# Patient Record
Sex: FEMALE | URBAN - METROPOLITAN AREA
[De-identification: names, ages, dates, MRNs, and addresses within clinical notes are randomized per-mention and may not be internally consistent; named-entity substitution may affect disease eponyms.]

---

## 2022-01-18 ENCOUNTER — SURGERY (OUTPATIENT)
Dept: URBAN - METROPOLITAN AREA SURGERY 56 | Facility: SURGERY | Age: 52
End: 2022-01-18
Payer: COMMERCIAL

## 2022-01-18 ENCOUNTER — OFFICE VISIT (OUTPATIENT)
Dept: URBAN - METROPOLITAN AREA CLINIC 89 | Facility: CLINIC | Age: 52
End: 2022-01-18
Payer: MEDICAID

## 2022-01-18 DIAGNOSIS — H40.20X3: ICD-10-CM

## 2022-01-18 DIAGNOSIS — H40.033 ANATOMICAL NARROW ANGLE, BILATERAL: Primary | ICD-10-CM

## 2022-01-18 PROCEDURE — 99204 OFFICE O/P NEW MOD 45 MIN: CPT | Performed by: OPTOMETRIST

## 2022-01-18 ASSESSMENT — INTRAOCULAR PRESSURE
OD: 12
OS: 60
OS: 60
OD: 12

## 2022-01-18 NOTE — IMPRESSION/PLAN
Impression: Primary angle-closure glaucoma, severe stage: H40.20x3. Plan: Advised patient that this is long standing and that there is little we can do to restore vision. Goal at this time is to reduce IOP and comfort the eye and prevent angle closure in the right eye.

## 2022-01-18 NOTE — IMPRESSION/PLAN
Impression: Anatomical narrow angle, bilateral: H40.033. Plan: Patient requires STAT YAG LPI, however cannot travel to [facility] (I had arranged for treatment by Claudeen Raman MD but patient stated she could not travel). Added patient to Dr José Antonio Santos schedule for today: BILATERAL YAG PI, SAME DAY

## 2022-01-19 ENCOUNTER — POST-OPERATIVE VISIT (OUTPATIENT)
Dept: URBAN - METROPOLITAN AREA CLINIC 89 | Facility: CLINIC | Age: 52
End: 2022-01-19

## 2022-01-19 DIAGNOSIS — Z48.810 ENCOUNTER FOR SURGICAL AFTERCARE FOLLOWING SURGERY ON A SENSE ORGAN: Primary | ICD-10-CM

## 2022-01-19 PROCEDURE — 99024 POSTOP FOLLOW-UP VISIT: CPT | Performed by: OPTOMETRIST

## 2022-01-19 RX ORDER — LATANOPROST 50 UG/ML
0.005 % SOLUTION OPHTHALMIC
Qty: 5 | Refills: 6 | Status: ACTIVE
Start: 2022-01-19

## 2022-01-19 ASSESSMENT — INTRAOCULAR PRESSURE
OD: 18
OS: 22

## 2022-01-19 NOTE — IMPRESSION/PLAN
Impression:  Encounter for surgical aftercare following surgery on a sense organ  Z48.810. Plan: Latanaprost OU qhs OU. Schedule repeat LPI 1:00 OD.

## 2022-05-12 ENCOUNTER — OFFICE VISIT (OUTPATIENT)
Dept: URBAN - METROPOLITAN AREA CLINIC 89 | Facility: CLINIC | Age: 52
End: 2022-05-12
Payer: MEDICAID

## 2022-05-12 DIAGNOSIS — H40.20X3: Primary | ICD-10-CM

## 2022-05-12 PROCEDURE — 99214 OFFICE O/P EST MOD 30 MIN: CPT | Performed by: OPTOMETRIST

## 2022-05-12 RX ORDER — DORZOLAMIDE HYDROCHLORIDE AND TIMOLOL MALEATE 20; 5 MG/ML; MG/ML
SOLUTION/ DROPS OPHTHALMIC
Qty: 5 | Refills: 1 | Status: ACTIVE
Start: 2022-05-12

## 2022-05-12 ASSESSMENT — INTRAOCULAR PRESSURE
OD: 15
OS: 40
OS: 52

## 2022-05-12 NOTE — IMPRESSION/PLAN
Impression: Primary angle-closure glaucoma, severe stage: H40.20X3. Plan: IOP has spiked in left eye again; patient cannot tolerate latanoprost.

1 drop each of timolol 0.5% and brimonodine 0.2% OS at 1:39pm and 1:46pm
1 drop Vyzulta OS 1:39pm
1 tab acetazolamide 250mg PO at 1:40pm

Start acetazolamide 250mg PO x 3 days. Dispensed 5 tablets Start generic CoSopt BID OS

RTC 24 hour IOP check Refer to Benjamin Luna MD for advanced glaucoma surgical procedure OS Addendum:  at 2:04pm patient started to feel short of breath. She was put into a supine position and EMS was notified. No loss of consciousness. Patient was transported to [facility] ED.

## 2022-08-25 ENCOUNTER — OFFICE VISIT (OUTPATIENT)
Dept: URBAN - METROPOLITAN AREA CLINIC 89 | Facility: CLINIC | Age: 52
End: 2022-08-25
Payer: MEDICAID

## 2022-08-25 DIAGNOSIS — H40.20X3: Primary | ICD-10-CM

## 2022-08-25 PROCEDURE — 99213 OFFICE O/P EST LOW 20 MIN: CPT | Performed by: OPTOMETRIST

## 2022-08-25 ASSESSMENT — INTRAOCULAR PRESSURE
OD: 13
OS: 36

## 2022-08-25 NOTE — IMPRESSION/PLAN
Impression: Primary angle-closure glaucoma, severe stage: H40.20X3. Plan: Patient has uncontrolled glaucoma OS. She is sensitive to topical medications and did not followup with the referral to Dr Lilliam Palmer (Glaucoma specialist) for subspecialist care. Discussed need for subspecialist care and created new referral for patient.

## 2023-04-05 ENCOUNTER — OFFICE VISIT (OUTPATIENT)
Dept: URBAN - METROPOLITAN AREA CLINIC 89 | Facility: CLINIC | Age: 53
End: 2023-04-05
Payer: MEDICAID

## 2023-04-05 DIAGNOSIS — H04.123 TEAR FILM INSUFFICIENCY OF BILATERAL LACRIMAL GLANDS: Primary | ICD-10-CM

## 2023-04-05 PROCEDURE — 99213 OFFICE O/P EST LOW 20 MIN: CPT | Performed by: OPTOMETRIST

## 2023-04-05 RX ORDER — EPINASTINE HYDROCHLORIDE 0.5 MG/ML
0.05 % SOLUTION/ DROPS OPHTHALMIC
Qty: 5 | Refills: 5 | Status: ACTIVE
Start: 2023-04-05

## 2023-04-05 ASSESSMENT — INTRAOCULAR PRESSURE
OD: 8
OS: 20

## 2023-04-05 NOTE — IMPRESSION/PLAN
Impression: Tear film insufficiency of bilateral lacrimal glands: H04.123. Plan: Return for LARGE punctal plug procedure Patient has dry eye due to inadequate aqueous layer of tears. Patient is advised to start Systane Ultra QID. Patient was educated regarding the rebound drying effect of SHAQ and therefore Systane Ultra is strongly recommended. Patient has findings/symptoms consistent with allergic conjunctivitis.   Start epinastine ophthalmic solution BID OU w/ 6 refills

## 2023-06-01 ENCOUNTER — OFFICE VISIT (OUTPATIENT)
Dept: URBAN - METROPOLITAN AREA CLINIC 89 | Facility: CLINIC | Age: 53
End: 2023-06-01
Payer: MEDICAID

## 2023-06-01 DIAGNOSIS — H00.011 HORDEOLUM OF RIGHT UPPER EYELID: Primary | ICD-10-CM

## 2023-06-01 DIAGNOSIS — H04.123 TEAR FILM INSUFFICIENCY OF BILATERAL LACRIMAL GLANDS: ICD-10-CM

## 2023-06-01 PROCEDURE — 99213 OFFICE O/P EST LOW 20 MIN: CPT | Performed by: OPTOMETRIST

## 2023-06-01 RX ORDER — DOXYCYCLINE HYCLATE 100 MG/1
100 MG CAPSULE, GELATIN COATED ORAL
Qty: 20 | Refills: 0 | Status: ACTIVE
Start: 2023-06-01

## 2023-06-01 NOTE — IMPRESSION/PLAN
Impression: Tear film insufficiency of bilateral lacrimal glands: H04.123. Plan: Punctal plugs inserted without incident.   Large BVI Parasol Lot [id number]

## 2023-06-01 NOTE — IMPRESSION/PLAN
Impression: Hordeolum of right upper eyelid: H00.011. Plan: start doxycycline 100mg bid x 10 days. Start warm compresses at least four times daily. Handout given.

## 2023-08-01 ENCOUNTER — OFFICE VISIT (OUTPATIENT)
Dept: URBAN - METROPOLITAN AREA CLINIC 89 | Facility: CLINIC | Age: 53
End: 2023-08-01
Payer: MEDICAID

## 2023-08-01 DIAGNOSIS — H00.11 CHALAZION RIGHT UPPER LID: Primary | ICD-10-CM

## 2023-08-01 PROCEDURE — 99212 OFFICE O/P EST SF 10 MIN: CPT | Performed by: OPTOMETRIST

## 2023-08-01 ASSESSMENT — INTRAOCULAR PRESSURE
OS: 10
OD: 10

## 2023-12-11 ENCOUNTER — OFFICE VISIT (OUTPATIENT)
Dept: URBAN - METROPOLITAN AREA CLINIC 3 | Facility: CLINIC | Age: 53
End: 2023-12-11
Payer: MEDICAID

## 2023-12-11 DIAGNOSIS — H04.123 TEAR FILM INSUFFICIENCY OF BILATERAL LACRIMAL GLANDS: ICD-10-CM

## 2023-12-11 DIAGNOSIS — H00.11 CHALAZION RIGHT UPPER LID: Primary | ICD-10-CM

## 2023-12-11 PROCEDURE — 92012 INTRM OPH EXAM EST PATIENT: CPT | Performed by: OPHTHALMOLOGY

## 2023-12-11 ASSESSMENT — INTRAOCULAR PRESSURE
OS: 15
OD: 7

## 2024-02-06 ENCOUNTER — PROCEDURE (OUTPATIENT)
Dept: URBAN - METROPOLITAN AREA SURGERY 1 | Facility: SURGERY | Age: 54
End: 2024-02-06
Payer: MEDICAID

## 2024-02-06 ENCOUNTER — Encounter (OUTPATIENT)
Dept: URBAN - METROPOLITAN AREA SURGERY 2 | Facility: SURGERY | Age: 54
End: 2024-02-06
Payer: MEDICAID

## 2024-02-06 PROCEDURE — 67800 REMOVE EYELID LESION: CPT | Performed by: OPHTHALMOLOGY

## 2024-02-06 RX ORDER — ERYTHROMYCIN 5 MG/G
OINTMENT OPHTHALMIC
Qty: 3.5 | Refills: 0 | Status: INACTIVE
Start: 2024-02-06 | End: 2024-02-06

## 2024-04-16 ENCOUNTER — OFFICE VISIT (OUTPATIENT)
Dept: URBAN - METROPOLITAN AREA CLINIC 89 | Facility: CLINIC | Age: 54
End: 2024-04-16
Payer: MEDICAID

## 2024-04-16 DIAGNOSIS — H40.20X3: ICD-10-CM

## 2024-04-16 DIAGNOSIS — H10.45 OTHER CHRONIC ALLERGIC CONJUNCTIVITIS: Primary | ICD-10-CM

## 2024-04-16 PROCEDURE — 99213 OFFICE O/P EST LOW 20 MIN: CPT | Performed by: OPTOMETRIST

## 2024-04-16 RX ORDER — FLUOROMETHOLONE 1 MG/ML
0.1 % SOLUTION/ DROPS OPHTHALMIC
Qty: 10 | Refills: 1 | Status: ACTIVE
Start: 2024-04-16

## 2024-04-16 ASSESSMENT — INTRAOCULAR PRESSURE
OS: 11
OD: 10